# Patient Record
Sex: MALE | URBAN - METROPOLITAN AREA
[De-identification: names, ages, dates, MRNs, and addresses within clinical notes are randomized per-mention and may not be internally consistent; named-entity substitution may affect disease eponyms.]

---

## 2021-08-27 ENCOUNTER — TELEPHONE ENCOUNTER (OUTPATIENT)
Dept: URBAN - METROPOLITAN AREA CLINIC 9 | Facility: CLINIC | Age: 66
End: 2021-08-27

## 2022-07-30 ENCOUNTER — TELEPHONE ENCOUNTER (OUTPATIENT)
Age: 67
End: 2022-07-30

## 2022-07-31 ENCOUNTER — TELEPHONE ENCOUNTER (OUTPATIENT)
Age: 67
End: 2022-07-31

## 2022-08-08 ENCOUNTER — LAB OUTSIDE AN ENCOUNTER (OUTPATIENT)
Dept: URBAN - METROPOLITAN AREA CLINIC 60 | Facility: CLINIC | Age: 67
End: 2022-08-08

## 2022-08-08 ENCOUNTER — OFFICE VISIT (OUTPATIENT)
Dept: URBAN - METROPOLITAN AREA CLINIC 60 | Facility: CLINIC | Age: 67
End: 2022-08-08
Payer: MEDICARE

## 2022-08-08 ENCOUNTER — WEB ENCOUNTER (OUTPATIENT)
Dept: URBAN - METROPOLITAN AREA CLINIC 60 | Facility: CLINIC | Age: 67
End: 2022-08-08

## 2022-08-08 VITALS
HEIGHT: 72 IN | BODY MASS INDEX: 29.39 KG/M2 | OXYGEN SATURATION: 98 % | HEART RATE: 68 BPM | SYSTOLIC BLOOD PRESSURE: 130 MMHG | WEIGHT: 217 LBS | TEMPERATURE: 97.5 F | DIASTOLIC BLOOD PRESSURE: 70 MMHG

## 2022-08-08 DIAGNOSIS — Z86.010 PERSONAL HISTORY OF COLONIC POLYPS: ICD-10-CM

## 2022-08-08 DIAGNOSIS — K21.9 GASTROESOPHAGEAL REFLUX DISEASE WITHOUT ESOPHAGITIS: ICD-10-CM

## 2022-08-08 PROCEDURE — 99204 OFFICE O/P NEW MOD 45 MIN: CPT | Performed by: INTERNAL MEDICINE

## 2022-08-08 RX ORDER — LOSARTAN POTASSIUM 100 MG/1
TAKE ONE TABLET BY MOUTH ONE TIME DAILY TABLET, FILM COATED ORAL
Qty: 90 UNSPECIFIED | Refills: 1 | Status: ON HOLD | COMMUNITY

## 2022-08-08 RX ORDER — OMEPRAZOLE 20 MG/1
1 CAPSULE 30 MINUTES BEFORE MORNING MEAL CAPSULE, DELAYED RELEASE ORAL ONCE A DAY
Status: ACTIVE | COMMUNITY

## 2022-08-08 RX ORDER — FAMOTIDINE 40 MG/1
1 TABLET AT BEDTIME TABLET, FILM COATED ORAL ONCE A DAY
Qty: 30 | Refills: 2 | OUTPATIENT
Start: 2022-08-08

## 2022-08-08 RX ORDER — POLYETHYLENE GLYCOL-3350 AND ELECTROLYTES WITH FLAVOR PACK 240; 5.84; 2.98; 6.72; 22.72 G/278.26G; G/278.26G; G/278.26G; G/278.26G; G/278.26G
AS DIRECTED POWDER, FOR SOLUTION ORAL
Qty: 4000 MILLILITER | Refills: 0 | OUTPATIENT
Start: 2022-08-08 | End: 2022-08-09

## 2022-08-08 RX ORDER — MELOXICAM 15 MG/1
TAKE ONE TABLET BY MOUTH ONE TIME DAILY TABLET ORAL
Qty: 30 UNSPECIFIED | Refills: 0 | Status: ACTIVE | COMMUNITY

## 2022-08-08 NOTE — HPI-TODAY'S VISIT:
1.  Choroidal Nevus OD: Appears Benign and stable. Observe for changes. 2. MELITA w/ PEK OU- Recommend increase ATs QID OU routinely 3. PCO  OS: (Posterior Capsule Opacification)   Observe and consider yag cap when pt feels pco visually significant and visual acuity decreases to appropriate level. 4. Pseudophakia OU - Doing Well 5. Glaucoma Suspect OU (CD 0.60/0.80): Past w/u negative. IOP stable. Patient is considered Low Risk. Condition was discussed with patient and patient understands. Will continue to monitor patient for any progression in condition. Patient was advised to call us with any problems questions or concerns. 6.  GR I Hypertensive Retinopathy OU- Stable continue HTN Control7. PVD w/o Tear OS- RD precautions. Patient deferred Manifest Rx today. Return for an appointment in 1 year 27 with Dr. Luana Wesley. Raiza is a pleasant 67-year-old male who presents today for evaluation of acid reflux  Following with cardiology for chest discomfort and hypertension.  Cardiology feels chest discomfort is atypical and not anginal but given his risk factors there is plan to proceed with treadmill exercise stress test.  Given trial of over-the-counter Nexium or Prilosec.  Labs dated 2/2/2022 demonstrated a normal hemoglobin.  Normal platelets.  Normal creatinine.  GFR 55.8 (L).  AST 11 (L) but otherwise normal LFTs.  TSH normal.  Patient c/o chest burning, which started in DEC , on daily basis.  Usually post prandially. Denies dysphagia.  States symptoms improved but started to experience more nightime symptoms when lying supine.    Does admit to late night eating .    Last colonoscopy 5yrs ago without polyps though has history . Hx of diverticulitis. Denies rectal bleeding,constipation, diarhea . GF with hx of CRC. Denies wt loss in past 3mo.

## 2022-08-22 ENCOUNTER — TELEPHONE ENCOUNTER (OUTPATIENT)
Dept: URBAN - METROPOLITAN AREA CLINIC 63 | Facility: CLINIC | Age: 67
End: 2022-08-22

## 2022-08-26 ENCOUNTER — TELEPHONE ENCOUNTER (OUTPATIENT)
Dept: URBAN - METROPOLITAN AREA CLINIC 60 | Facility: CLINIC | Age: 67
End: 2022-08-26

## 2022-08-30 ENCOUNTER — TELEPHONE ENCOUNTER (OUTPATIENT)
Dept: URBAN - METROPOLITAN AREA CLINIC 63 | Facility: CLINIC | Age: 67
End: 2022-08-30

## 2022-08-30 RX ORDER — POLYETHYLENE GLYCOL-3350 AND ELECTROLYTES WITH FLAVOR PACK 240; 5.84; 2.98; 6.72; 22.72 G/278.26G; G/278.26G; G/278.26G; G/278.26G; G/278.26G
AS DIRECTED POWDER, FOR SOLUTION ORAL
Qty: 4000 MILLILITER | Refills: 0
Start: 2022-08-08 | End: 2022-09-02

## 2022-08-30 RX ORDER — ESOMEPRAZOLE MAGNESIUM 20 MG/1
1 CAPSULE 30MIN BEFORE BREAKFAST OR DINNER CAPSULE, DELAYED RELEASE ORAL ONCE A DAY
Qty: 30 CAPSULE | Refills: 1 | OUTPATIENT
Start: 2022-09-02

## 2022-09-01 ENCOUNTER — TELEPHONE ENCOUNTER (OUTPATIENT)
Dept: URBAN - METROPOLITAN AREA CLINIC 63 | Facility: CLINIC | Age: 67
End: 2022-09-01

## 2022-09-06 ENCOUNTER — OFFICE VISIT (OUTPATIENT)
Dept: URBAN - METROPOLITAN AREA SURGERY CENTER 4 | Facility: SURGERY CENTER | Age: 67
End: 2022-09-06

## 2022-09-20 ENCOUNTER — OFFICE VISIT (OUTPATIENT)
Dept: URBAN - METROPOLITAN AREA CLINIC 63 | Facility: CLINIC | Age: 67
End: 2022-09-20

## 2022-09-30 ENCOUNTER — OFFICE VISIT (OUTPATIENT)
Dept: URBAN - METROPOLITAN AREA SURGERY CENTER 4 | Facility: SURGERY CENTER | Age: 67
End: 2022-09-30

## 2022-10-17 ENCOUNTER — OFFICE VISIT (OUTPATIENT)
Dept: URBAN - METROPOLITAN AREA CLINIC 60 | Facility: CLINIC | Age: 67
End: 2022-10-17

## 2022-11-21 ENCOUNTER — OFFICE VISIT (OUTPATIENT)
Dept: URBAN - METROPOLITAN AREA SURGERY CENTER 4 | Facility: SURGERY CENTER | Age: 67
End: 2022-11-21

## 2022-12-01 ENCOUNTER — TELEPHONE ENCOUNTER (OUTPATIENT)
Dept: URBAN - METROPOLITAN AREA CLINIC 60 | Facility: CLINIC | Age: 67
End: 2022-12-01

## 2022-12-01 RX ORDER — LANSOPRAZOLE 30 MG/1
1 CAPSULE BEFORE A MEAL CAPSULE, DELAYED RELEASE ORAL ONCE A DAY
Qty: 30 | OUTPATIENT
Start: 2022-12-01

## 2022-12-06 ENCOUNTER — OFFICE VISIT (OUTPATIENT)
Dept: URBAN - METROPOLITAN AREA SURGERY CENTER 4 | Facility: SURGERY CENTER | Age: 67
End: 2022-12-06
Payer: MEDICARE

## 2022-12-06 ENCOUNTER — CLAIMS CREATED FROM THE CLAIM WINDOW (OUTPATIENT)
Dept: URBAN - METROPOLITAN AREA CLINIC 4 | Facility: CLINIC | Age: 67
End: 2022-12-06
Payer: MEDICARE

## 2022-12-06 DIAGNOSIS — K31.89 OTHER DISEASES OF STOMACH AND DUODENUM: ICD-10-CM

## 2022-12-06 DIAGNOSIS — K21.9 GASTROESOPHAGEAL REFLUX DISEASE WITHOUT ESOPHAGITIS: ICD-10-CM

## 2022-12-06 DIAGNOSIS — D12.4 POLYP OF DESCENDING COLON: ICD-10-CM

## 2022-12-06 DIAGNOSIS — D12.5 SIGMOID POLYP: ICD-10-CM

## 2022-12-06 DIAGNOSIS — D12.3 POLYP OF TRANSVERSE COLON: ICD-10-CM

## 2022-12-06 DIAGNOSIS — K29.70 GASTRITIS: ICD-10-CM

## 2022-12-06 DIAGNOSIS — Z86.010 PERSONAL HISTORY OF COLONIC POLYPS: ICD-10-CM

## 2022-12-06 DIAGNOSIS — K22.89 OTHER SPECIFIED DISEASE OF ESOPHAGUS: ICD-10-CM

## 2022-12-06 PROCEDURE — 43239 EGD BIOPSY SINGLE/MULTIPLE: CPT | Performed by: CLINIC/CENTER

## 2022-12-06 PROCEDURE — 43239 EGD BIOPSY SINGLE/MULTIPLE: CPT | Performed by: INTERNAL MEDICINE

## 2022-12-06 PROCEDURE — 88305 TISSUE EXAM BY PATHOLOGIST: CPT | Performed by: PATHOLOGY

## 2022-12-06 PROCEDURE — 45385 COLONOSCOPY W/LESION REMOVAL: CPT | Performed by: INTERNAL MEDICINE

## 2022-12-06 PROCEDURE — 45380 COLONOSCOPY AND BIOPSY: CPT | Performed by: INTERNAL MEDICINE

## 2022-12-06 PROCEDURE — 45385 COLONOSCOPY W/LESION REMOVAL: CPT | Performed by: CLINIC/CENTER

## 2022-12-06 PROCEDURE — 45380 COLONOSCOPY AND BIOPSY: CPT | Performed by: CLINIC/CENTER

## 2022-12-06 RX ORDER — MELOXICAM 15 MG/1
TAKE ONE TABLET BY MOUTH ONE TIME DAILY TABLET ORAL
Qty: 30 UNSPECIFIED | Refills: 0 | Status: ACTIVE | COMMUNITY

## 2022-12-06 RX ORDER — OMEPRAZOLE 20 MG/1
1 CAPSULE 30 MINUTES BEFORE MORNING MEAL CAPSULE, DELAYED RELEASE ORAL ONCE A DAY
Status: ACTIVE | COMMUNITY

## 2022-12-06 RX ORDER — ESOMEPRAZOLE MAGNESIUM 20 MG/1
1 CAPSULE 30MIN BEFORE BREAKFAST OR DINNER CAPSULE, DELAYED RELEASE ORAL ONCE A DAY
Qty: 30 CAPSULE | Refills: 1 | Status: ACTIVE | COMMUNITY
Start: 2022-09-02

## 2022-12-06 RX ORDER — FAMOTIDINE 40 MG/1
1 TABLET AT BEDTIME TABLET, FILM COATED ORAL ONCE A DAY
Qty: 30 | Refills: 2 | Status: ACTIVE | COMMUNITY
Start: 2022-08-08

## 2022-12-06 RX ORDER — LANSOPRAZOLE 30 MG/1
1 CAPSULE BEFORE A MEAL CAPSULE, DELAYED RELEASE ORAL ONCE A DAY
Qty: 30 | Status: ACTIVE | COMMUNITY
Start: 2022-12-01

## 2022-12-06 RX ORDER — LOSARTAN POTASSIUM 100 MG/1
TAKE ONE TABLET BY MOUTH ONE TIME DAILY TABLET, FILM COATED ORAL
Qty: 90 UNSPECIFIED | Refills: 1 | Status: ON HOLD | COMMUNITY

## 2022-12-07 PROBLEM — 4556007 GASTRITIS: Status: ACTIVE | Noted: 2022-12-07

## 2022-12-18 ENCOUNTER — ERX REFILL RESPONSE (OUTPATIENT)
Dept: URBAN - METROPOLITAN AREA CLINIC 60 | Facility: CLINIC | Age: 67
End: 2022-12-18

## 2022-12-18 RX ORDER — LANSOPRAZOLE 30 MG/1
TAKE ONE CAPSULE BY MOUTH ONE TIME DAILY BEFORE A MEAL CAPSULE, DELAYED RELEASE ORAL
Qty: 30 CAPSULE | Refills: 0 | OUTPATIENT

## 2022-12-18 RX ORDER — LANSOPRAZOLE 30 MG/1
1 CAPSULE BEFORE A MEAL CAPSULE, DELAYED RELEASE ORAL ONCE A DAY
Qty: 30 | OUTPATIENT

## 2023-01-09 ENCOUNTER — OFFICE VISIT (OUTPATIENT)
Dept: URBAN - METROPOLITAN AREA CLINIC 60 | Facility: CLINIC | Age: 68
End: 2023-01-09
Payer: MEDICARE

## 2023-01-09 VITALS
DIASTOLIC BLOOD PRESSURE: 82 MMHG | BODY MASS INDEX: 29.8 KG/M2 | TEMPERATURE: 98.5 F | WEIGHT: 220 LBS | HEART RATE: 76 BPM | OXYGEN SATURATION: 97 % | HEIGHT: 72 IN | SYSTOLIC BLOOD PRESSURE: 140 MMHG

## 2023-01-09 DIAGNOSIS — K21.9 GASTROESOPHAGEAL REFLUX DISEASE WITHOUT ESOPHAGITIS: ICD-10-CM

## 2023-01-09 DIAGNOSIS — Z86.010 PERSONAL HISTORY OF COLONIC POLYPS: ICD-10-CM

## 2023-01-09 DIAGNOSIS — Z12.11 COLON CANCER SCREENING: ICD-10-CM

## 2023-01-09 PROCEDURE — 99213 OFFICE O/P EST LOW 20 MIN: CPT | Performed by: PHYSICIAN ASSISTANT

## 2023-01-09 RX ORDER — FAMOTIDINE 40 MG/1
AS DIRECTED TABLET, FILM COATED ORAL ONCE A DAY
Qty: 90 TABLET | Refills: 1 | OUTPATIENT

## 2023-01-09 RX ORDER — OMEPRAZOLE 20 MG/1
1 CAPSULE 30 MINUTES BEFORE MORNING MEAL CAPSULE, DELAYED RELEASE ORAL ONCE A DAY
Status: ACTIVE | COMMUNITY

## 2023-01-09 RX ORDER — LANSOPRAZOLE 30 MG/1
1 CAPSULE 30 MINS BEFORE A MEAL CAPSULE, DELAYED RELEASE ORAL ONCE A DAY
Qty: 90 CAPSULE | Refills: 1 | OUTPATIENT

## 2023-01-09 RX ORDER — ONDANSETRON HYDROCHLORIDE 4 MG/1
1 TABLET TABLET, FILM COATED ORAL
Qty: 2 | Refills: 0 | OUTPATIENT
Start: 2023-01-09

## 2023-01-09 RX ORDER — CALCIUM CARBONATE 500 MG/1
1 TABLET TABLET ORAL ONCE A DAY
Status: ACTIVE | COMMUNITY

## 2023-01-09 RX ORDER — POLYETHYLENE GLYCOL 3350, SODIUM CHLORIDE, SODIUM BICARBONATE, POTASSIUM CHLORIDE 420; 11.2; 5.72; 1.48 G/4L; G/4L; G/4L; G/4L
AS DIRECTED POWDER, FOR SOLUTION ORAL ONCE
Qty: 4000 | Refills: 0 | OUTPATIENT
Start: 2023-01-09 | End: 2023-01-10

## 2023-01-09 RX ORDER — MELOXICAM 15 MG/1
TAKE ONE TABLET BY MOUTH ONE TIME DAILY TABLET ORAL
Qty: 30 UNSPECIFIED | Refills: 0 | Status: ACTIVE | COMMUNITY

## 2023-01-09 RX ORDER — LANSOPRAZOLE 30 MG/1
TAKE ONE CAPSULE BY MOUTH ONE TIME DAILY BEFORE A MEAL CAPSULE, DELAYED RELEASE ORAL
Qty: 30 CAPSULE | Refills: 0 | Status: ACTIVE | COMMUNITY

## 2023-01-09 NOTE — HPI-TODAY'S VISIT:
Raiza is a pleasant 67-year-old male who presents today for a procedure follow up.  EGD dated 12/6/2022 showed an irregular Z-line.  Gastritis.  Normal duodenum.  Small hiatal hernia. PPI effect.  No evidence of H. pylori or intestinal metaplasia.  Lower third esophageal biopsies revealed squamocolumnar mucosa with reflux type changes negative for Shell's esophagus.  Colonoscopy dated 12/6/2022 showed fair preparation of the colon.  6 mm tubular adenoma removed from the splenic flexure.  6 mm tubular adenoma removed from the splenic flexure.  Two 5 to 6 mm tubular adenomas removed from the mid descending colon.  8 mm tubular adenoma removed from the mid descending colon.  3 diminutive tubular adenomas removed from the mid descending colon.  2 diminutive tubular adenomas removed from the mid sigmoid colon.  Diverticulosis in the sigmoid colon.  Nonbleeding internal hemorrhoids.  Repeat colonoscopy in 3 months due to suboptimal prep.  Labs dated 2/2/2022 demonstrated a normal hemoglobin.  Normal platelets.  Normal creatinine.  GFR 55.8 (L).  AST 11 (L) but otherwise normal LFTs.  TSH normal.  Last visit noted history of chest burning on a daily basis, usually postprandially. Noted more nighttime symptoms when lying supine.  Did admit to late night eating.  History of colon polyps.  Family history of colon cancer in his grandfather. Placed on omeprazole 20 mg daily and Pepcid 40 mg nightly.  Advised a high-fiber diet and Metamucil daily. He noted minimal efficacy with omeprazole and esomeprazole. Currently on lansoprazole 30 mg qd. Not on famotidine. No issues after procedure. Patient did not complete entire prep. He did not take Zofran either. He states he had 17 polyps removed and 5 polyps respectively removed on his 1st two colonoscopies. He does not recall where had procedures done. No record available for review. Notes 1-2 daily bowel movements without issues of constipation or diarrhea. Famotidine was of minimal efficacy alone. Patient's wife does admit that his diet choices are contributing to his symptoms. For example, he had [employer] spicy fried chicken last night and is complaining of heartburn this morning. Denies nausea, vomiting, dysphagia, odynophagia, hematemesis, coffee-ground emesis, abdominal pain, change in bowel habits, abnormal weight loss, BRBPR or melena.  No other GI complaints at this time

## 2023-01-10 ENCOUNTER — LAB OUTSIDE AN ENCOUNTER (OUTPATIENT)
Dept: URBAN - METROPOLITAN AREA CLINIC 60 | Facility: CLINIC | Age: 68
End: 2023-01-10

## 2023-01-10 PROBLEM — 428283002: Status: ACTIVE | Noted: 2022-08-08

## 2023-01-10 PROBLEM — 266435005: Status: ACTIVE | Noted: 2022-08-08

## 2023-01-16 ENCOUNTER — LAB OUTSIDE AN ENCOUNTER (OUTPATIENT)
Dept: URBAN - METROPOLITAN AREA CLINIC 60 | Facility: CLINIC | Age: 68
End: 2023-01-16

## 2023-01-17 ENCOUNTER — OFFICE VISIT (OUTPATIENT)
Dept: URBAN - METROPOLITAN AREA SURGERY CENTER 4 | Facility: SURGERY CENTER | Age: 68
End: 2023-01-17

## 2023-06-15 ENCOUNTER — TELEPHONE ENCOUNTER (OUTPATIENT)
Dept: URBAN - METROPOLITAN AREA CLINIC 60 | Facility: CLINIC | Age: 68
End: 2023-06-15

## 2023-06-15 RX ORDER — FAMOTIDINE 40 MG/1
AS DIRECTED TABLET, FILM COATED ORAL ONCE A DAY
Qty: 90 TABLET | Refills: 1

## 2023-06-30 ENCOUNTER — LAB OUTSIDE AN ENCOUNTER (OUTPATIENT)
Dept: URBAN - METROPOLITAN AREA CLINIC 63 | Facility: CLINIC | Age: 68
End: 2023-06-30

## 2023-06-30 ENCOUNTER — DASHBOARD ENCOUNTERS (OUTPATIENT)
Age: 68
End: 2023-06-30

## 2023-06-30 ENCOUNTER — OFFICE VISIT (OUTPATIENT)
Dept: URBAN - METROPOLITAN AREA CLINIC 63 | Facility: CLINIC | Age: 68
End: 2023-06-30
Payer: MEDICARE

## 2023-06-30 ENCOUNTER — TELEPHONE ENCOUNTER (OUTPATIENT)
Dept: URBAN - METROPOLITAN AREA CLINIC 63 | Facility: CLINIC | Age: 68
End: 2023-06-30

## 2023-06-30 VITALS
WEIGHT: 221.8 LBS | HEIGHT: 72 IN | OXYGEN SATURATION: 98 % | SYSTOLIC BLOOD PRESSURE: 140 MMHG | DIASTOLIC BLOOD PRESSURE: 80 MMHG | BODY MASS INDEX: 30.04 KG/M2 | TEMPERATURE: 97.2 F | HEART RATE: 85 BPM

## 2023-06-30 DIAGNOSIS — Z86.010 PERSONAL HISTORY OF COLONIC POLYPS: ICD-10-CM

## 2023-06-30 DIAGNOSIS — Z12.11 COLON CANCER SCREENING: ICD-10-CM

## 2023-06-30 DIAGNOSIS — K21.9 GASTROESOPHAGEAL REFLUX DISEASE WITHOUT ESOPHAGITIS: ICD-10-CM

## 2023-06-30 PROCEDURE — 99214 OFFICE O/P EST MOD 30 MIN: CPT | Performed by: PHYSICIAN ASSISTANT

## 2023-06-30 RX ORDER — FAMOTIDINE 40 MG/1
AS DIRECTED TABLET, FILM COATED ORAL ONCE A DAY
Qty: 90 TABLET | Refills: 1 | OUTPATIENT

## 2023-06-30 RX ORDER — FAMOTIDINE 40 MG/1
AS DIRECTED TABLET, FILM COATED ORAL ONCE A DAY
Qty: 90 TABLET | Refills: 1 | Status: ACTIVE | COMMUNITY

## 2023-06-30 RX ORDER — POLYETHYLENE GLYCOL 3350, SODIUM CHLORIDE, SODIUM BICARBONATE, POTASSIUM CHLORIDE 420; 11.2; 5.72; 1.48 G/4L; G/4L; G/4L; G/4L
AS DIRECTED POWDER, FOR SOLUTION ORAL ONCE
Qty: 4000 | Refills: 0 | OUTPATIENT
Start: 2023-06-30 | End: 2023-07-01

## 2023-06-30 RX ORDER — DEXLANSOPRAZOLE 60 MG/1
1 CAPSULE 30 MINUTES BEFORE BREAKFAST CAPSULE, DELAYED RELEASE ORAL ONCE A DAY
Qty: 30 | Refills: 2 | OUTPATIENT
Start: 2023-06-30

## 2023-06-30 RX ORDER — ONDANSETRON HYDROCHLORIDE 4 MG/1
1 TABLET TABLET, FILM COATED ORAL
Qty: 2 | Refills: 0 | OUTPATIENT

## 2023-06-30 RX ORDER — SUCRALFATE 1 G/1
1 TABLET ON AN EMPTY STOMACH TABLET ORAL
Qty: 120 TABLET | Refills: 2 | OUTPATIENT
Start: 2023-06-30 | End: 2023-07-30

## 2023-06-30 NOTE — HPI-TODAY'S VISIT:
Raiza is a pleasant 68-year-old male who presents today for a a follow up. Plan last visit was to proceed with colonoscopy but this was canceled. [facility] genetic testing not completed  EGD dated 12/6/2022 showed an irregular Z-line.  Gastritis.  Normal duodenum.  Small hiatal hernia. PPI effect.  No evidence of H. pylori or intestinal metaplasia.  Lower third esophageal biopsies revealed squamocolumnar mucosa with reflux type changes negative for Shell's esophagus.  Colonoscopy dated 12/6/2022 showed fair preparation of the colon.  6 mm tubular adenoma removed from the splenic flexure.  6 mm tubular adenoma removed from the splenic flexure.  Two 5 to 6 mm tubular adenomas removed from the mid descending colon.  8 mm tubular adenoma removed from the mid descending colon.  3 diminutive tubular adenomas removed from the mid descending colon.  2 diminutive tubular adenomas removed from the mid sigmoid colon.  Diverticulosis in the sigmoid colon.  Nonbleeding internal hemorrhoids.  Repeat colonoscopy in 3 months due to suboptimal prep.  Labs dated 2/2/2022 demonstrated a normal hemoglobin.  Normal platelets.  Normal creatinine.  GFR 55.8 (L).  AST 11 (L) but otherwise normal LFTs.  TSH normal.  Family history of colon cancer in his grandfather. He states he had 17 polyps removed and 5 polyps respectively removed on his 1st two colonoscopies. He does not recall where had procedures done. No record available for review. Chronic history of dyspepsia. He noted minimal efficacy with omeprazole and esomeprazole. Symptoms occurring postprandially and worse at night.  Has admitted to late-night eating and poor dietary habits.  Last visit we discussed taking lansoprazole 30 mg once daily in the morning and famotidine 40 mg nightly.  Patient did not complete entire prep on his previous colonoscopy. At today's visit notes increased acid reflux in the evening. Having to take TUMs regularly. Following with cardiology for chest pain. Lansoprazole and famotidine of minimal efficacy. Notes 1-2 daily bowel movements without issues of constipation or diarrhea. Denies nausea, vomiting, dysphagia, odynophagia, hematemesis, coffee-ground emesis, abdominal pain, change in bowel habits, abnormal weight loss, BRBPR or melena.  No other GI complaints at this time

## 2023-11-21 ENCOUNTER — OFFICE VISIT (OUTPATIENT)
Dept: URBAN - METROPOLITAN AREA SURGERY CENTER 4 | Facility: SURGERY CENTER | Age: 68
End: 2023-11-21

## 2023-12-05 ENCOUNTER — OFFICE VISIT (OUTPATIENT)
Dept: URBAN - METROPOLITAN AREA CLINIC 63 | Facility: CLINIC | Age: 68
End: 2023-12-05

## 2025-01-10 ENCOUNTER — LAB OUTSIDE AN ENCOUNTER (OUTPATIENT)
Dept: URBAN - METROPOLITAN AREA CLINIC 60 | Facility: CLINIC | Age: 70
End: 2025-01-10

## 2025-01-10 ENCOUNTER — OFFICE VISIT (OUTPATIENT)
Dept: URBAN - METROPOLITAN AREA CLINIC 60 | Facility: CLINIC | Age: 70
End: 2025-01-10
Payer: MEDICARE

## 2025-01-10 VITALS
SYSTOLIC BLOOD PRESSURE: 124 MMHG | BODY MASS INDEX: 30.94 KG/M2 | DIASTOLIC BLOOD PRESSURE: 76 MMHG | HEART RATE: 80 BPM | HEIGHT: 72 IN | TEMPERATURE: 98.8 F | WEIGHT: 228.4 LBS | RESPIRATION RATE: 12 BRPM | OXYGEN SATURATION: 98 %

## 2025-01-10 DIAGNOSIS — K80.20 GALLSTONES: ICD-10-CM

## 2025-01-10 DIAGNOSIS — K57.92 DIVERTICULITIS: ICD-10-CM

## 2025-01-10 DIAGNOSIS — Z86.0100 PERSONAL HISTORY OF COLONIC POLYPS: ICD-10-CM

## 2025-01-10 DIAGNOSIS — K21.9 CHRONIC GERD: ICD-10-CM

## 2025-01-10 PROBLEM — 235919008: Status: ACTIVE | Noted: 2025-01-10

## 2025-01-10 PROBLEM — 307496006: Status: ACTIVE | Noted: 2025-01-10

## 2025-01-10 PROCEDURE — 99214 OFFICE O/P EST MOD 30 MIN: CPT | Performed by: PHYSICIAN ASSISTANT

## 2025-01-10 RX ORDER — ATORVASTATIN CALCIUM, FILM COATED 40 MG/1
TAKE ONE TABLET BY MOUTH ONE TIME DAILY TABLET ORAL
Qty: 90 UNSPECIFIED | Refills: 1 | Status: ACTIVE | COMMUNITY

## 2025-01-10 RX ORDER — LOSARTAN POTASSIUM 50 MG/1
TAKE ONE TABLET BY MOUTH ONE TIME DAILY TABLET, FILM COATED ORAL
Qty: 90 UNSPECIFIED | Refills: 0 | Status: ACTIVE | COMMUNITY

## 2025-01-10 RX ORDER — SILDENAFIL 20 MG/1
TAKE ONE TABLET BY MOUTH ONE TIME DAILY TABLET ORAL
Qty: 90 UNSPECIFIED | Refills: 2 | Status: ACTIVE | COMMUNITY

## 2025-01-10 NOTE — HPI-TODAY'S VISIT:
69-year-old male, former patient of Dr. Norris with history of  CAD with stent in Aug 2023, GERD and colon polyps presents to the office for surveillance colonoscopy.  He was last seen in the office in June 2023.  He reported a history of chronic intermittent dyspepsia.  He had minimal improvement with omeprazole and Nexium in the past.  Dyspepsia occurred postprandially and was worse at night.  He reported having poor dietary habits and admitted to eating late at night.  He was following with cardiology for chest pain at the time.  He was prescribed Dexilant 60 mg daily in the morning, famotidine 40 mg at night.  He was also given Carafate 1 g 4 times daily for 30 days.  Antireflux diet and lifestyle was recommended.  Colonoscopy was ordered and preop clearance was requested from his cardiologist but he canceled his procedure.   He presents back to the office today after having a flare of diverticulitis in early Dec 2024. Sometimes he passess gas and mucus will come out. He still has some cramping in the left lower quadrant after meals which is not severe. No fevers. No blood in the stool. He saw his PCP who prescribed a course of antibiotics and his pain improved but did not resolve. He reports a history of lactose intolerance and avoids dairy or uses lactaid when he has dairy. He states he has intermittent gallbladder attacks and was scheduled for surgery in Oct 2024 but his surgery was canceled because he was on prasugrel. His symptoms ultimately resolved. No records available.   Colonoscopy 12/6/2022 for high risk surveillance with personal history of colon polyps: Two 6 mm tubular adenomas were removed from the splenic flexure.  6 tubular adenomas measuring less than 1 cm were removed from the mid descending colon.  2 diminutive tubular adenomas were removed from the sigmoid colon.  Additional findings included sigmoid diverticulosis and small internal hemorrhoids.  Bowel prep was suboptimal and repeat colonoscopy was recommended in 3 months.  EGD 12/6/2022 to assess GERD symptoms:The Z-line was irregular at the GE junction.  Distal esophageal biopsy demonstrated reflux type changes.  No evidence of Shell's esophagus.  Small hiatal hernia.  Mild inflammation was found in the gastric antrum.  Gastric biopsy was negative for H. pylori.  Normal duodenum. He has a family history of colon cancer in his grandfather..

## 2025-01-15 ENCOUNTER — LAB OUTSIDE AN ENCOUNTER (OUTPATIENT)
Dept: URBAN - METROPOLITAN AREA CLINIC 63 | Facility: CLINIC | Age: 70
End: 2025-01-15

## 2025-01-16 ENCOUNTER — TELEPHONE ENCOUNTER (OUTPATIENT)
Dept: URBAN - METROPOLITAN AREA CLINIC 60 | Facility: CLINIC | Age: 70
End: 2025-01-16

## 2025-01-16 RX ORDER — AMOXICILLIN AND CLAVULANATE POTASSIUM 875; 125 MG/1; MG/1
1 TABLET TABLET, FILM COATED ORAL
Qty: 28 TABLET | Refills: 0 | OUTPATIENT
Start: 2025-01-16 | End: 2025-01-30

## 2025-02-04 ENCOUNTER — TELEPHONE ENCOUNTER (OUTPATIENT)
Dept: URBAN - METROPOLITAN AREA CLINIC 63 | Facility: CLINIC | Age: 70
End: 2025-02-04

## 2025-02-07 ENCOUNTER — OFFICE VISIT (OUTPATIENT)
Dept: URBAN - METROPOLITAN AREA CLINIC 60 | Facility: CLINIC | Age: 70
End: 2025-02-07

## 2025-02-14 ENCOUNTER — OFFICE VISIT (OUTPATIENT)
Dept: URBAN - METROPOLITAN AREA CLINIC 60 | Facility: CLINIC | Age: 70
End: 2025-02-14
Payer: MEDICARE

## 2025-02-14 ENCOUNTER — LAB OUTSIDE AN ENCOUNTER (OUTPATIENT)
Dept: URBAN - METROPOLITAN AREA CLINIC 60 | Facility: CLINIC | Age: 70
End: 2025-02-14

## 2025-02-14 VITALS
OXYGEN SATURATION: 99 % | SYSTOLIC BLOOD PRESSURE: 108 MMHG | TEMPERATURE: 98.7 F | HEART RATE: 84 BPM | HEIGHT: 72 IN | WEIGHT: 227.2 LBS | RESPIRATION RATE: 12 BRPM | BODY MASS INDEX: 30.77 KG/M2 | DIASTOLIC BLOOD PRESSURE: 66 MMHG

## 2025-02-14 DIAGNOSIS — Z86.0100 HISTORY OF COLON POLYPS: ICD-10-CM

## 2025-02-14 DIAGNOSIS — K57.92 DIVERTICULITIS: ICD-10-CM

## 2025-02-14 PROCEDURE — 99214 OFFICE O/P EST MOD 30 MIN: CPT | Performed by: PHYSICIAN ASSISTANT

## 2025-02-14 RX ORDER — ATORVASTATIN CALCIUM, FILM COATED 40 MG/1
TAKE ONE TABLET BY MOUTH ONE TIME DAILY TABLET ORAL
Qty: 90 UNSPECIFIED | Refills: 1 | Status: ACTIVE | COMMUNITY

## 2025-02-14 RX ORDER — SILDENAFIL 20 MG/1
TAKE ONE TABLET BY MOUTH ONE TIME DAILY TABLET ORAL
Qty: 90 UNSPECIFIED | Refills: 2 | Status: ACTIVE | COMMUNITY

## 2025-02-14 RX ORDER — LOSARTAN POTASSIUM 50 MG/1
TAKE ONE TABLET BY MOUTH ONE TIME DAILY TABLET, FILM COATED ORAL
Qty: 90 UNSPECIFIED | Refills: 0 | Status: ACTIVE | COMMUNITY

## 2025-02-14 NOTE — HPI-PREVIOUS PROCEDURES
Colonoscopy 12/6/2022 for high risk surveillance with personal history of colon polyps: Two 6 mm tubular adenomas were removed from the splenic flexure. 6 tubular adenomas measuring less than 1 cm were removed from the mid descending colon. 2 diminutive tubular adenomas were removed from the sigmoid colon. Additional findings included sigmoid diverticulosis and small internal hemorrhoids. Bowel prep was suboptimal and repeat colonoscopy was recommended in 3 months.  EGD 12/6/2022 to assess GERD symptoms:The Z-line was irregular at the GE junction. Distal esophageal biopsy demonstrated reflux type changes. No evidence of Shell's esophagus. Small hiatal hernia. Mild inflammation was found in the gastric antrum. Gastric biopsy was negative for H. pylori. Normal duodenum.

## 2025-02-14 NOTE — HPI-TODAY'S VISIT:
70-year-old male with history of CAD with stent in August 2023, GERD, colon polyps presented to the office last month after having a flare of diverticulitis which occurred in early December 2024.  He saw his PCP who prescribed a course of antibiotics and his pain improved but did not resolve.  He was still having some cramping in the left lower quadrant of the abdomen after meals which was not severe.  He reported that he would sometimes pass gas and would have some mucus discharge.  He denied any blood in the stool.  He was not having any fevers.  He denied having any diarrhea.  He reported a history of lactose intolerance and was avoiding dairy or would use Lactaid whenever he was consuming dairy.  He reported having intermittent biliary colic and he was scheduled for cholecystectomy in October 2024 but his surgery was canceled because he was on Effient.  His symptoms ultimately resolved and he was advised to follow-up with general surgery.  He was previously treated for GERD symptoms with Dexilant but his GERD symptoms all resolved after he had PCI and stent in August 2023 and was no longer on PPI.  CT scan of the abdomen and pelvis was done with contrast on January 15, 2025 which demonstrated sigmoid and descending colonic diverticulosis with thickening of the mid to distal sigmoid colon consistent with ongoing inflammation.  No drainable fluid collection.  We was prescribed a 14-day course of Augmentin following CT. He is scheduled for colonoscopy on 3/12/25.  He follows up today reporting freuquent stools within 15-20 minutes of eating. LLQ pain resolved. He has anywhere from 2-5 BMs per day. Stools type 4 on BSS. He is not having any watery diarrhea.  Denies any blood in the stool. He has occasional low abdominal cramping but states the pain he felt related to diverticulitis has resolved.   He has a family history of colon cancer in his grandfather.

## 2025-02-19 ENCOUNTER — TELEPHONE ENCOUNTER (OUTPATIENT)
Dept: URBAN - METROPOLITAN AREA CLINIC 63 | Facility: CLINIC | Age: 70
End: 2025-02-19

## 2025-02-25 ENCOUNTER — TELEPHONE ENCOUNTER (OUTPATIENT)
Dept: URBAN - METROPOLITAN AREA CLINIC 63 | Facility: CLINIC | Age: 70
End: 2025-02-25

## 2025-02-25 ENCOUNTER — LAB OUTSIDE AN ENCOUNTER (OUTPATIENT)
Dept: URBAN - METROPOLITAN AREA CLINIC 63 | Facility: CLINIC | Age: 70
End: 2025-02-25

## 2025-02-27 ENCOUNTER — TELEPHONE ENCOUNTER (OUTPATIENT)
Dept: URBAN - METROPOLITAN AREA CLINIC 63 | Facility: CLINIC | Age: 70
End: 2025-02-27

## 2025-02-27 RX ORDER — AMOXICILLIN AND CLAVULANATE POTASSIUM 875; 125 MG/1; MG/1
1 TABLET TABLET, FILM COATED ORAL
Qty: 28 TABLET | Refills: 0 | OUTPATIENT
Start: 2025-02-27 | End: 2025-03-13

## 2025-03-11 ENCOUNTER — TELEPHONE ENCOUNTER (OUTPATIENT)
Dept: URBAN - METROPOLITAN AREA CLINIC 60 | Facility: CLINIC | Age: 70
End: 2025-03-11

## 2025-03-12 ENCOUNTER — OFFICE VISIT (OUTPATIENT)
Dept: URBAN - METROPOLITAN AREA SURGERY CENTER 4 | Facility: SURGERY CENTER | Age: 70
End: 2025-03-12

## 2025-03-21 ENCOUNTER — OFFICE VISIT (OUTPATIENT)
Dept: URBAN - METROPOLITAN AREA CLINIC 60 | Facility: CLINIC | Age: 70
End: 2025-03-21

## 2025-03-27 ENCOUNTER — LAB OUTSIDE AN ENCOUNTER (OUTPATIENT)
Dept: URBAN - METROPOLITAN AREA CLINIC 63 | Facility: CLINIC | Age: 70
End: 2025-03-27

## 2025-04-02 ENCOUNTER — TELEPHONE ENCOUNTER (OUTPATIENT)
Dept: URBAN - METROPOLITAN AREA CLINIC 63 | Facility: CLINIC | Age: 70
End: 2025-04-02

## 2025-04-11 ENCOUNTER — OFFICE VISIT (OUTPATIENT)
Dept: URBAN - METROPOLITAN AREA CLINIC 60 | Facility: CLINIC | Age: 70
End: 2025-04-11

## 2025-04-25 ENCOUNTER — LAB OUTSIDE AN ENCOUNTER (OUTPATIENT)
Dept: URBAN - METROPOLITAN AREA CLINIC 60 | Facility: CLINIC | Age: 70
End: 2025-04-25

## 2025-04-25 ENCOUNTER — OFFICE VISIT (OUTPATIENT)
Dept: URBAN - METROPOLITAN AREA CLINIC 60 | Facility: CLINIC | Age: 70
End: 2025-04-25
Payer: MEDICARE

## 2025-04-25 DIAGNOSIS — Z86.0100 PERSONAL HISTORY OF COLONIC POLYPS: ICD-10-CM

## 2025-04-25 DIAGNOSIS — K57.92 DIVERTICULITIS: ICD-10-CM

## 2025-04-25 PROCEDURE — 99214 OFFICE O/P EST MOD 30 MIN: CPT | Performed by: PHYSICIAN ASSISTANT

## 2025-04-25 RX ORDER — LOSARTAN POTASSIUM 50 MG/1
TAKE ONE TABLET BY MOUTH ONE TIME DAILY TABLET, FILM COATED ORAL
Qty: 90 UNSPECIFIED | Refills: 0 | Status: ACTIVE | COMMUNITY

## 2025-04-25 RX ORDER — NITROGLYCERIN 0.4 MG/1
PLACE ONE TABLET UNDER THE TONGUE AT ONSET OF CHEST PAIN. MAY REPEAT EVERY 5 MINUTES AS NEEDED FOR CHEST PAIN UP TO 3 TABLETS IN 15 MINUTES TABLET SUBLINGUAL
Qty: 25 UNSPECIFIED | Refills: 0 | Status: ACTIVE | COMMUNITY

## 2025-04-25 RX ORDER — ATORVASTATIN CALCIUM, FILM COATED 40 MG/1
TAKE ONE TABLET BY MOUTH ONE TIME DAILY TABLET ORAL
Qty: 90 UNSPECIFIED | Refills: 1 | Status: ACTIVE | COMMUNITY

## 2025-04-25 RX ORDER — SILDENAFIL 20 MG/1
TAKE ONE TABLET BY MOUTH ONE TIME DAILY TABLET ORAL
Qty: 90 UNSPECIFIED | Refills: 2 | Status: ACTIVE | COMMUNITY

## 2025-04-25 NOTE — HPI-TODAY'S VISIT:
70-year-old male with history of CAD with stent in August 2023, GERD, colon polyps presents to the office for follow-up.  He was last seen in the office in February 2025.  He was treated by his PCP in December for diverticulitis with a course of antibiotics.  He was still having pain in the left lower quadrant and had a CT scan January 15, 2025 which demonstrated ongoing inflammation consistent with diverticulitis in the mid to distal sigmoid colon.  There was no drainable fluid collection.  He was prescribed a 14-day course of Augmentin following his CT.  At his last office visit in February 2025, he reported having frequent stools within 15-20 minutes of eating.  His left lower quadrant pain had completely resolved.  He reported having anywhere from 2-5 bowel movements per day.  Stools type IV on the Young stool scale.  He was not having any watery diarrhea.  He denied any blood in the stool.  He reported having occasional low abdominal cramping but the pain that he felt when he had diverticulitis had completely resolved.  CT scan of the abdomen and pelvis was repeated on February 25, 2025 which demonstrated subacute diverticulitis involving the sigmoid colon, relatively stable since CT in January 2025.  He was treated with another 2-week course of Augmentin and CT was repeated March 28, 2025 which demonstrated mild interval improvement of the ongoing diverticulitis involving the sigmoid colon since 1/15/2025.  No perforation or abscess. He was referred to [facility] for a 2-week course of IV antibiotics and he was also referred to colorectal surgery for an opinion.  He follows up today doing well. He never had his IV antibiotics. States he was never contacted by [facility]. He has an appointment with Dr Colbert on 6/3/25. He low abdominal pain which has been intermittent. He has low abdominal pain 3-4 days per week. Pain rated 6-7 at worst though this is infrequent. Usually pain 3/10. Pain is relieved after he has a BM. He has anywhere from 2-5 soft stools per day after he eats or after he drinks coffee. He has no diarrhea or blood in the stool  He ate [employer] a few days ago and had diarrhea after this. He has not had any fevers. Overall, he states he has been doing pretty well.  He has a family history of colon cancer in 1 grandfather.

## 2025-05-07 ENCOUNTER — LAB OUTSIDE AN ENCOUNTER (OUTPATIENT)
Dept: URBAN - METROPOLITAN AREA CLINIC 63 | Facility: CLINIC | Age: 70
End: 2025-05-07

## 2025-05-09 ENCOUNTER — TELEPHONE ENCOUNTER (OUTPATIENT)
Dept: URBAN - METROPOLITAN AREA CLINIC 63 | Facility: CLINIC | Age: 70
End: 2025-05-09

## 2025-05-13 ENCOUNTER — TELEPHONE ENCOUNTER (OUTPATIENT)
Dept: URBAN - METROPOLITAN AREA CLINIC 63 | Facility: CLINIC | Age: 70
End: 2025-05-13

## 2025-05-23 ENCOUNTER — OFFICE VISIT (OUTPATIENT)
Dept: URBAN - METROPOLITAN AREA CLINIC 60 | Facility: CLINIC | Age: 70
End: 2025-05-23

## 2025-07-25 ENCOUNTER — OFFICE VISIT (OUTPATIENT)
Dept: URBAN - METROPOLITAN AREA CLINIC 60 | Facility: CLINIC | Age: 70
End: 2025-07-25

## 2025-07-25 RX ORDER — ATORVASTATIN CALCIUM, FILM COATED 40 MG/1
TAKE ONE TABLET BY MOUTH ONE TIME DAILY TABLET ORAL
Qty: 90 UNSPECIFIED | Refills: 1 | Status: ACTIVE | COMMUNITY

## 2025-07-25 RX ORDER — SILDENAFIL 20 MG/1
TAKE ONE TABLET BY MOUTH ONE TIME DAILY TABLET ORAL
Qty: 90 UNSPECIFIED | Refills: 2 | Status: ACTIVE | COMMUNITY

## 2025-07-25 RX ORDER — NITROGLYCERIN 0.4 MG/1
PLACE ONE TABLET UNDER THE TONGUE AT ONSET OF CHEST PAIN. MAY REPEAT EVERY 5 MINUTES AS NEEDED FOR CHEST PAIN UP TO 3 TABLETS IN 15 MINUTES TABLET SUBLINGUAL
Qty: 25 UNSPECIFIED | Refills: 0 | Status: ACTIVE | COMMUNITY

## 2025-07-25 RX ORDER — LOSARTAN POTASSIUM 50 MG/1
TAKE ONE TABLET BY MOUTH ONE TIME DAILY TABLET, FILM COATED ORAL
Qty: 90 UNSPECIFIED | Refills: 0 | Status: ACTIVE | COMMUNITY